# Patient Record
Sex: MALE | Race: BLACK OR AFRICAN AMERICAN | Employment: FULL TIME | URBAN - METROPOLITAN AREA
[De-identification: names, ages, dates, MRNs, and addresses within clinical notes are randomized per-mention and may not be internally consistent; named-entity substitution may affect disease eponyms.]

---

## 2023-01-15 NOTE — PROGRESS NOTES
HISTORY OF PRESENT ILLNESS    Michael Leung is a 40 y.o. male is a new patient , he wants to have semen analysis   He resorts history of undescended left testicle when he was 8 yo with orchiopexy. Left testicle normal size. he and his wife are trying to get pregnant . Reports wife had history of fibroids  He is not on any medications. He has no history of trauma to the testicles other than the surgery #1 #2 patient has no history of a varicocele or any causes of infertility. IPSS  Score: 10    Past Medical History:  PMHx (including negatives):  has no past medical history on file. PSurgHx:  has no past surgical history on file. PSocHx:  reports that he has been smoking cigarettes. He has never used smokeless tobacco. He reports that he does not currently use alcohol. He reports that he does not use drugs. Home Medications    Not on File            Review of Systems   Constitutional: Negative. HENT: Negative. Eyes: Negative. Respiratory: Negative. Cardiovascular: Negative. Gastrointestinal: Negative. Genitourinary: Negative. Musculoskeletal: Negative. Skin: Negative. Neurological: Negative. Endo/Heme/Allergies: Negative. Psychiatric/Behavioral: Negative. Patient denies the symptoms of COVID-19 per routine screening guidelines. Physical Exam  Vitals and nursing note reviewed. Constitutional:       Appearance: Normal appearance. He is obese. HENT:      Head: Normocephalic. Right Ear: Tympanic membrane normal.      Nose: Nose normal.      Comments: wnl  Eyes:      Pupils: Pupils are equal, round, and reactive to light. Cardiovascular:      Rate and Rhythm: Normal rate. Pulses: Normal pulses. Heart sounds: Normal heart sounds. Pulmonary:      Effort: Pulmonary effort is normal.   Abdominal:      General: Abdomen is flat. Palpations: Abdomen is soft.    Genitourinary:     Penis: Normal.       Testes: Normal.      Rectum: Normal. Comments: No palpable varicocele  Musculoskeletal:         General: Normal range of motion. Cervical back: Normal range of motion. Skin:     General: Skin is warm. Neurological:      General: No focal deficit present. Mental Status: He is alert and oriented to person, place, and time. Psychiatric:         Mood and Affect: Mood normal.         Behavior: Behavior normal.         Thought Content: Thought content normal.         Judgment: Judgment normal.       ASSESSMENT and PLAN    With infertility status post undescended testes repair years ago. No other symptoms. He may not have a problem with a sperm count. We have discussed about sperm count results but what is why some people could have a problem conc  Eiving  Will send his sperm off for count set him up for a official sperm count and go from there          [employer] may have a reminder for a \"due or due soon\" health maintenance. The patient has been encouraged to contact their primary care provider for follow-up on this health maintenance or other necessary and/or routine health screening.

## 2023-01-17 ENCOUNTER — OFFICE VISIT (OUTPATIENT)
Dept: UROLOGY | Age: 38
End: 2023-01-17
Payer: COMMERCIAL

## 2023-01-17 VITALS
BODY MASS INDEX: 37.13 KG/M2 | WEIGHT: 245 LBS | DIASTOLIC BLOOD PRESSURE: 72 MMHG | SYSTOLIC BLOOD PRESSURE: 159 MMHG | HEART RATE: 102 BPM | HEIGHT: 68 IN | OXYGEN SATURATION: 100 % | TEMPERATURE: 97.8 F

## 2023-01-17 DIAGNOSIS — Z31.69 INFERTILITY COUNSELING: Primary | ICD-10-CM

## 2023-01-17 LAB
BILIRUB UR QL: NEGATIVE
GLUCOSE UR-MCNC: NEGATIVE MG/DL
KETONES P FAST UR STRIP-MCNC: NEGATIVE MG/DL
PH UR STRIP: 6 [PH] (ref 4.6–8)
PROT UR QL STRIP: NEGATIVE
SP GR UR STRIP: 1.02 (ref 1–1.03)
UA UROBILINOGEN AMB POC: NORMAL (ref 0.2–1)
URINALYSIS CLARITY POC: CLEAR
URINALYSIS COLOR POC: YELLOW
URINE BLOOD POC: NEGATIVE
URINE LEUKOCYTES POC: NEGATIVE
URINE NITRITES POC: NEGATIVE

## 2023-01-17 PROCEDURE — 81003 URINALYSIS AUTO W/O SCOPE: CPT | Performed by: UROLOGY

## 2023-01-17 PROCEDURE — 99203 OFFICE O/P NEW LOW 30 MIN: CPT | Performed by: UROLOGY

## 2023-01-17 NOTE — PROGRESS NOTES
Chief Complaint   Patient presents with    New Patient    Other     Wants to have Seman analysis        PHQ-9 score is    Negative    Vitals:    01/17/23 1059   BP: (!) 159/72   Pulse: (!) 102   Temp: 97.8 °F (36.6 °C)   TempSrc: Temporal   SpO2: 100%   Weight: 245 lb (111.1 kg)   Height: 5' 8\" (1.727 m)   PainSc:   0 - No pain        1. \"Have you been to the ER, urgent care clinic since your last visit? Hospitalized since your last visit? \" No    2. \"Have you seen or consulted any other health care providers outside of the [address] [place] [address] since your last visit? \" No     3. For patients aged 39-70: Has the patient had a colonoscopy / FIT/ Cologuard? NA - based on age      If the patient is female:    4. For patients aged 41-77: Has the patient had a mammogram within the past 2 years? NA - based on age or sex      11. For patients aged 21-65: Has the patient had a pap smear?  NA - based on age or sex

## 2023-01-26 ENCOUNTER — TELEPHONE (OUTPATIENT)
Dept: UROLOGY | Age: 38
End: 2023-01-26

## 2023-01-26 NOTE — TELEPHONE ENCOUNTER
Patient will be coming by today to  a sterile cup for his analysis for hafsa naidu that dr Mani Cuellar referred him too,

## 2023-02-03 ENCOUNTER — TELEPHONE (OUTPATIENT)
Dept: UROLOGY | Age: 38
End: 2023-02-03

## 2023-02-06 NOTE — TELEPHONE ENCOUNTER
I contacted [facility] they are going to fax over the results, then I will give to TEMI WANG & SAGRARIO GARC[facility] for review and then contact the patient.

## 2023-02-09 ENCOUNTER — TELEPHONE (OUTPATIENT)
Dept: UROLOGY | Age: 38
End: 2023-02-09

## 2023-02-09 NOTE — TELEPHONE ENCOUNTER
Patient called back and said hafsa naidu faxed it yesterday to helen    Can we confirm if she has it or anyone else

## 2023-02-09 NOTE — TELEPHONE ENCOUNTER
PT CALLED TO SEE IF WE HAVE RECEIVED RESULTS FROM SEMEN ANALYSIS DONE AT [facility]     TOD THEM THEY NEEDED TO BE FAXED OVER     PROVIDED THEM THE FAX NUMBER TO OUR FACILITY [phone]    TOLD THEM DR Clay Quinn , OR A NURSE WILL GIVE THEM A CALL WHEN WE RECEIVE THEM

## 2023-02-10 ENCOUNTER — TELEPHONE (OUTPATIENT)
Dept: UROLOGY | Age: 38
End: 2023-02-10

## 2023-02-10 NOTE — TELEPHONE ENCOUNTER
I scanned results into [employer], and spoke to Haseeb diggs who advised that I schedule patient an apt to review results.

## 2023-02-10 NOTE — TELEPHONE ENCOUNTER
I scheduled patient for results followup apt Monday, 2/13/23 at 10:15am  and left detailed VM for patient.

## 2023-02-12 NOTE — PROGRESS NOTES
HISTORY OF PRESENT ILLNESS    Perlita Regan is a 40 y.o. male is here for virtual visit to discuss sperm analysis  History:  He resorts history of undescended left testicle when he was 10 yo with orchiopexy. Left testicle normal size. He and his wife are trying to get pregnant . Reports wife had history of fibroids  He is not on any medications. He has no history of trauma to the testicles other than the surgery #1 #2 patient has no history of a varicocele or any causes of infertility. Past Medical History:  PMHx (including negatives):  has no past medical history on file. PSurgHx:  has no past surgical history on file. PSocHx:  reports that he has been smoking cigarettes. He has never used smokeless tobacco. He reports that he does not currently use alcohol. He reports that he does not use drugs. Home Medications    Not on File        Chronic Conditions Addressed Today       1. Infertility counseling - Primary       ROS  Patient denies the symptoms of COVID-19 per routine screening guidelines. Physical Exam    ASSESSMENT and PLAN  Diagnoses and all orders for this visit:    1. Infertility counseling     Patient and I had a 10-minute discussion about his infertility. Preparation for the visit was 7 minutes so total was 17 minutes. Patient has a sperm count of 84 million sperm. He has 3+ progression 70% motility he has no white cells good pH. He only question will be normal morphology 1% compared to greater than 4% for grading of the sperm. There is nothing surgically I can do to help. I would have him take the sperm count to his wife's gynecologist and infertility specialist.  1 would think the pregnancy is still a good chance with a sperm despite morphology at 1%. Perlita Regan may have a reminder for a \"due or due soon\" health maintenance.  The patient has been encouraged to contact their primary care provider for follow-up on this health maintenance or other necessary and/or routine health screening.

## 2023-02-13 ENCOUNTER — VIRTUAL VISIT (OUTPATIENT)
Dept: UROLOGY | Age: 38
End: 2023-02-13
Payer: COMMERCIAL

## 2023-02-13 DIAGNOSIS — Z31.69 INFERTILITY COUNSELING: Primary | ICD-10-CM

## 2023-02-13 PROCEDURE — 99212 OFFICE O/P EST SF 10 MIN: CPT | Performed by: UROLOGY
